# Patient Record
Sex: FEMALE | Race: WHITE | Employment: UNEMPLOYED | URBAN - METROPOLITAN AREA
[De-identification: names, ages, dates, MRNs, and addresses within clinical notes are randomized per-mention and may not be internally consistent; named-entity substitution may affect disease eponyms.]

---

## 2017-04-18 ENCOUNTER — OFFICE VISIT (OUTPATIENT)
Dept: OBGYN CLINIC | Facility: CLINIC | Age: 42
End: 2017-04-18

## 2017-04-18 VITALS
BODY MASS INDEX: 21.53 KG/M2 | DIASTOLIC BLOOD PRESSURE: 62 MMHG | WEIGHT: 126.13 LBS | SYSTOLIC BLOOD PRESSURE: 100 MMHG | HEIGHT: 64 IN

## 2017-04-18 DIAGNOSIS — R87.810 ASCUS WITH POSITIVE HIGH RISK HPV CERVICAL: Primary | ICD-10-CM

## 2017-04-18 DIAGNOSIS — R87.610 ASCUS WITH POSITIVE HIGH RISK HPV CERVICAL: Primary | ICD-10-CM

## 2017-04-18 PROCEDURE — 88305 TISSUE EXAM BY PATHOLOGIST: CPT | Performed by: OBSTETRICS & GYNECOLOGY

## 2017-04-18 PROCEDURE — 57454 BX/CURETT OF CERVIX W/SCOPE: CPT | Performed by: OBSTETRICS & GYNECOLOGY

## 2017-04-18 NOTE — PROGRESS NOTES
Colposcopy    Pap = ASCUS + HPV    Patient was positioned in stir-ups. She was consented for colposcopy and possible biopsies. I discussed with her to expect some cramping and light vaginal bleeding after the procedure.   I explained that she would be not

## 2017-04-20 ENCOUNTER — TELEPHONE (OUTPATIENT)
Dept: OBGYN CLINIC | Facility: CLINIC | Age: 42
End: 2017-04-20

## 2017-05-05 ENCOUNTER — TELEPHONE (OUTPATIENT)
Dept: OBGYN CLINIC | Facility: CLINIC | Age: 42
End: 2017-05-05

## 2017-05-05 NOTE — TELEPHONE ENCOUNTER
Pt states she and Dr. Ramone Renae have been plkaying phone tag and is unable to reach one another and if Dr. Ramone Renae can leave a message with results on voicemail.

## 2017-10-28 ENCOUNTER — OFFICE VISIT (OUTPATIENT)
Dept: FAMILY MEDICINE CLINIC | Facility: CLINIC | Age: 42
End: 2017-10-28

## 2017-10-28 VITALS
DIASTOLIC BLOOD PRESSURE: 70 MMHG | WEIGHT: 111.81 LBS | OXYGEN SATURATION: 98 % | BODY MASS INDEX: 19.09 KG/M2 | HEIGHT: 64 IN | SYSTOLIC BLOOD PRESSURE: 110 MMHG | HEART RATE: 75 BPM | RESPIRATION RATE: 14 BRPM | TEMPERATURE: 98 F

## 2017-10-28 DIAGNOSIS — N30.01 ACUTE CYSTITIS WITH HEMATURIA: Primary | ICD-10-CM

## 2017-10-28 PROCEDURE — 81003 URINALYSIS AUTO W/O SCOPE: CPT | Performed by: NURSE PRACTITIONER

## 2017-10-28 PROCEDURE — 99213 OFFICE O/P EST LOW 20 MIN: CPT | Performed by: NURSE PRACTITIONER

## 2017-10-28 RX ORDER — NITROFURANTOIN 25; 75 MG/1; MG/1
100 CAPSULE ORAL 2 TIMES DAILY
Qty: 14 CAPSULE | Refills: 0 | Status: SHIPPED | OUTPATIENT
Start: 2017-10-28 | End: 2017-11-04

## 2017-10-28 NOTE — PATIENT INSTRUCTIONS
· Complete full course of antibiotics. · Drink plenty of water  · Over the counter Tylenol/Motrin as needed for pain/discomfort. · Follow up in 2-3 days if symptoms do not improve or worsen.     · Return to clinic or see your primary care provider immedia · Only a small amount of urine comes out  · Blood in urine  · Abdominal discomfort. This is usually in the lower abdomen above the pubic bone.   · Cloudy urine  · Strong- or bad-smelling urine  · Unable to urinate (urinary retention)  · Unable to hold urine · If you are given phenazopydridine to reduce burning with urination, it will cause your urine to become a bright orange color. This can stain clothing.   Care and prevention  These self-care steps can help prevent future infections:  · Drink plenty of flui · Repeated vomiting, or unable to keep medicine down  · Weakness or dizziness  · Vaginal discharge  · Pain, redness, or swelling in the outer vaginal area (labia)  Date Last Reviewed: 10/1/2016  © 1613-7149 [employer].  [facility]

## 2017-10-28 NOTE — PROGRESS NOTES
CHIEF COMPLAINT:   Patient presents with:  UTI: since yesterday. HPI:   Tammy Elizalde is a 36year old female who presents with symptoms of UTI. Complaining of urinary frequency, urgency, dysuria for since yesterday.   Symptoms have been incr : Mild suprapubic tenderness, no bladder distention, no CVAT   EXTREMITIES: no edema      Recent Results (from the past 24 hour(s))  -URINALYSIS, AUTO, W/O SCOPE   Collection Time: 10/28/17 11:59 AM   Result Value Ref Range   GLUCOSE (URINE DIPSTICK) neg · Return to clinic or see your primary care provider immediately if you experience nausea, vomiting, or fever. What can you do to help prevent bladder infections? Urinate often during the day. You should also urinate after you have sex.    If you are a · Unable to urinate (urinary retention)  · Unable to hold urine in (urinary incontinence)  · Fever  · Loss of appetite  · Confusion (in older adults)  Causes  Bladder infections are not contagious.  You can't get one from someone else, from a toilet seat, o · Drink plenty of fluids to prevent dehydration and flush out your bladder. Do this unless you must restrict fluids for other health reasons, or your doctor told you not to. · Proper cleaning after going to the bathroom is important.  Wipe from front to ba © 5876-4866 [employer]. [facility] [facility] [place] [place]. All rights reserved. This information is not intended as a substitute for professional medical care. Always follow your healthcare professional's instructions.               Antoine Valdez

## 2021-12-24 ENCOUNTER — HOSPITAL ENCOUNTER (OUTPATIENT)
Age: 46
Discharge: HOME | End: 2021-12-24
Attending: EMERGENCY MEDICINE
Payer: COMMERCIAL

## 2021-12-24 VITALS
SYSTOLIC BLOOD PRESSURE: 114 MMHG | TEMPERATURE: 98 F | RESPIRATION RATE: 18 BRPM | HEART RATE: 78 BPM | DIASTOLIC BLOOD PRESSURE: 75 MMHG | OXYGEN SATURATION: 100 %

## 2021-12-24 DIAGNOSIS — Z20.822 ENCOUNTER FOR LABORATORY TESTING FOR COVID-19 VIRUS: ICD-10-CM

## 2021-12-24 DIAGNOSIS — J06.9 UPPER RESPIRATORY TRACT INFECTION, UNSPECIFIED TYPE: Primary | ICD-10-CM

## 2021-12-24 PROCEDURE — U0002 COVID-19 LAB TEST NON-CDC: HCPCS | Performed by: EMERGENCY MEDICINE

## 2021-12-24 PROCEDURE — 99213 OFFICE O/P EST LOW 20 MIN: CPT | Performed by: EMERGENCY MEDICINE

## 2021-12-24 NOTE — ED INITIAL ASSESSMENT (HPI)
Here for covid testing, pt states she had a covid exposure and has been having symptoms of headache , congestion and sore throat

## 2021-12-24 NOTE — ED PROVIDER NOTES
Patient Seen in: [facility]      History   Patient presents with:  Testing    Stated Complaint: testing; congestion, sore throat,headache    Subjective:   HPI    Exposed to covid and now as headache, mild sore throat and congestion.  No fever takes less than 2 seconds. Neurological:      General: No focal deficit present. Mental Status: She is alert. Sensory: No sensory deficit.    Psychiatric:         Mood and Affect: Mood normal.         Behavior: Behavior normal.              ED C

## 2025-04-27 ENCOUNTER — HOSPITAL ENCOUNTER (OUTPATIENT)
Age: 50
Discharge: HOME | End: 2025-04-27
Payer: COMMERCIAL

## 2025-04-27 VITALS
RESPIRATION RATE: 20 BRPM | DIASTOLIC BLOOD PRESSURE: 80 MMHG | HEART RATE: 89 BPM | OXYGEN SATURATION: 100 % | TEMPERATURE: 98 F | SYSTOLIC BLOOD PRESSURE: 118 MMHG

## 2025-04-27 DIAGNOSIS — R30.0 DYSURIA: Primary | ICD-10-CM

## 2025-04-27 DIAGNOSIS — N30.00 ACUTE CYSTITIS WITHOUT HEMATURIA: ICD-10-CM

## 2025-04-27 LAB
GLUCOSE UR STRIP-MCNC: NEGATIVE MG/DL
NITRITE UR QL STRIP: POSITIVE
PH UR STRIP: 6 [PH]
PROT UR STRIP-MCNC: >=300 MG/DL
SP GR UR STRIP: >=1.03
UROBILINOGEN UR STRIP-ACNC: <2 MG/DL

## 2025-04-27 PROCEDURE — 81002 URINALYSIS NONAUTO W/O SCOPE: CPT | Performed by: PHYSICIAN ASSISTANT

## 2025-04-27 PROCEDURE — 99204 OFFICE O/P NEW MOD 45 MIN: CPT | Performed by: PHYSICIAN ASSISTANT

## 2025-04-27 PROCEDURE — 87086 URINE CULTURE/COLONY COUNT: CPT | Performed by: PHYSICIAN ASSISTANT

## 2025-04-27 RX ORDER — NITROFURANTOIN 25; 75 MG/1; MG/1
100 CAPSULE ORAL 2 TIMES DAILY
Qty: 10 CAPSULE | Refills: 0 | Status: SHIPPED | OUTPATIENT
Start: 2025-04-27 | End: 2025-05-02

## 2025-04-27 RX ORDER — PHENAZOPYRIDINE HYDROCHLORIDE 100 MG/1
100 TABLET, FILM COATED ORAL 3 TIMES DAILY PRN
Qty: 6 TABLET | Refills: 0 | Status: SHIPPED | OUTPATIENT
Start: 2025-04-27 | End: 2025-04-30

## 2025-04-27 NOTE — ED PROVIDER NOTES
Patient Seen in: [facility]      History     Chief Complaint   Patient presents with    Urinary Symptoms     Stated Complaint: Bladder Infection    Subjective:   HPI  Mary Jo Monzon is a 50 year old female who presents with acute onset of urinary frequency, urgency, dysuria x 1 days.  Patient otherwise denies fevers, chills, abdominal/ flank pain, nausea, vomiting, diarrhea, vaginal discharge, hematuria.        History of Present Illness               Objective:     Past Medical History:    ASCUS with positive high risk HPV cervical    Needs Colpo    Human papilloma virus infection              History reviewed. No pertinent surgical history.             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      Positive for stated complaint: Bladder Infection  Other systems are as noted in HPI.  Constitutional and vital signs reviewed.      All other systems reviewed and negative except as noted above.                  Physical Exam     ED Triage Vitals [04/27/25 1335]   /80   Pulse 89   Resp 20   Temp 98 °F (36.7 °C)   Temp src Oral   SpO2 100 %   O2 Device None (Room air)       Current Vitals:   Vital Signs  BP: 118/80  Pulse: 89  Resp: 20  Temp: 98 °F (36.7 °C)  Temp src: Oral    Oxygen Therapy  SpO2: 100 %  O2 Device: None (Room air)        Physical Exam  Vitals and nursing note reviewed.   Constitutional:       General: She is not in acute distress.      Appearance: Normal appearance. She is normal weight. She is not ill-appearing, toxic-appearing or diaphoretic.   HENT:      Head: Normocephalic and atraumatic.      Right Ear: External ear normal.      Left Ear: External ear normal.      Nose: Nose normal.   Eyes:      Extraocular Movements: Extraocular movements intact.      Conjunctiva/sclera: Conjunctivae normal.      Pupils: Pupils are equal, round, and reactive to light.   Pulmonary:      Effort: Pulmonary effort is normal. No respiratory distress.   Abdominal:      General: Abdomen is flat. There is no distension.      Palpations: There is no mass.      Tenderness: There is no abdominal tenderness. There is no right CVA tenderness or left CVA tenderness.      Hernia: No hernia is present.   Musculoskeletal:         General: No swelling, tenderness, deformity or signs of injury. Normal range of motion.      Cervical back: Normal range of motion and neck supple.   Skin:     General: Skin is warm and dry.      Capillary Refill: Capillary refill takes less than 2 seconds.      Coloration: Skin is not jaundiced or pale.      Findings: No bruising, erythema, lesion or rash.   Neurological:      General: No focal deficit present.      Mental Status: She is alert and oriented to person, place, and time. Mental status is at baseline.      Gait: Gait normal.   Psychiatric:         Mood and Affect: Mood normal.         Behavior: Behavior normal.           Physical Exam                ED Course     Labs Reviewed   [facility] POCT URINALYSIS DIPSTICK - Abnormal; Notable for the following components:       Result Value    Urine Color Caitlin (*)     Urine Clarity Cloudy (*)     Protein urine >=300 (*)     Ketone, Urine Trace (*)     Bilirubin, Urine Small (*)     Blood, Urine Large (*)     Nitrite Urine Positive (*)     Leukocyte esterase urine Small (*)     All other components within normal limits   URINE CULTURE, ROUTINE          Results                                 MDM              Medical Decision Making  50 year old well appearing female who presents with acute onset of urinary frequency, urgency, dysuria x 1 days. Considerations to include acute cystitis vs  pyelonephritis versus nephrolithiasis.  Currently patient denies  flank/ abdominal pain, weakness, bowel/bladder incontinence, hematuria, dysuria, fevers, chills.    Plan   - UA/ POC urine dipstick.   Urine culture   - DC to home   - rx: macrobid 100mg po BID x 5 days.  Pyridium 100mg po TID x 3 days.   -refer to PCP   - return to ED/ clinic if symptoms worsens      Amount and/or Complexity of Data Reviewed  Labs: ordered. Decision-making details documented in ED Course.     Details: Urine dipstick-positive for leukocytes, nitrites, blood, bilirubin    Urine dipstick    Disposition and Plan     Clinical Impression:  1. Dysuria    2. Acute cystitis without hematuria         Disposition:  Discharge  4/27/2025  1:58 pm    Follow-up:  Anjali Staley MD  [address] [id number] [phone]          [facility] [place] [address]  [phone]              Medications Prescribed:  Current Discharge Medication List        START taking these medications    Details   nitrofurantoin monohydrate macro 100 MG Oral Cap Take 1 capsule (100 mg total) by mouth 2 (two) times daily for 5 days.  Qty: 10 capsule, Refills: 0      phenazopyridine 100 MG Oral Tab Take 1 tablet (100 mg total) by mouth 3 (three) times daily as needed for Pain.  Qty: 6 tablet, Refills: 0             Supplementary Documentation:

## (undated) NOTE — MR AVS SNAPSHOT
[address] at [address], [facility] 1  [phone]               Thank you for choosing us for your health care visit with Vickie Draper MD.  We are glad to serve you and happy to aguilar Expires: 6/17/2017  1:27 PM    If you have questions, you can call [phone] to talk to our [facility] Staff. Remember, [employer] is NOT to be used for urgent needs. For medical emergencies, dial 911.            Visit [employer]

## (undated) NOTE — Clinical Note
Lizzie Lu, FOP:[birth year]    CONSENT FOR PROCEDURE/SEDATION    1. I authorize the performance upon Lizzie Lu  the following: Colposcopy    2.  I authorize Dr. Alesia Farmer MD (and whomever is designated as the doctor’s assistant Witness: _________________________________________ Date:___________     Physician Signature: _______________________________ Date:___________